# Patient Record
(demographics unavailable — no encounter records)

---

## 2024-12-26 NOTE — REVIEW OF SYSTEMS
[Negative] : Heme/Lymph [de-identified] : as per HPI  [de-identified] : as per HPI  [de-identified] : as per HPI

## 2024-12-26 NOTE — REASON FOR VISIT
[Subsequent Evaluation] : a subsequent evaluation for [Parents] : parents [FreeTextEntry2] : eustachian tube dysfunction,.

## 2024-12-26 NOTE — ASSESSMENT
[FreeTextEntry1] : GE is a 4 year old boy presenting for eustachian tube dysfunction,   1 ear infection, no complications reassurance natural course  current URI, saline, honey, conservative measures  follow up as needed

## 2024-12-26 NOTE — HISTORY OF PRESENT ILLNESS
[No Personal or Family History of Easy Bruising, Bleeding, or Issues with General Anesthesia] : No Personal or Family History of easy bruising, bleeding, or issues with general anesthesia [de-identified] : Today I had the pleasure of seeing GE DOMINGUEZ for follow up of eustachian tube dysfunction,. History was obtained from patient, chart and parents History of speech delay and otitis media  Continues speech services 2x a week with great improvements  Recent hospitalization last month for left otalgia was found to have a left ear infection-treated with amoxicillin.  No otorrhea  Reports cough for about one day- mom gave him otc cough medication and nasal saline spray  Mom notices hard nasal crusting and nasal congestion.  Denies coughing up blood or mucus  Eating and drinking well with no s/s of aspiration  No snoring.  Denies epistaxis, anterior rhinorrhea and fevers.

## 2025-06-04 NOTE — ASSESSMENT
[FreeTextEntry1] : Cough - no underlying allergies   Will try Flonase 2 puffs each nostril QD x 1 month for possible PND

## 2025-06-04 NOTE — SOCIAL HISTORY
[House] : [unfilled] lives in a house  [None] : none [Single] : single [FreeTextEntry1] : Lives with grandparents - mother  [Bedroom] : not in the bedroom [Basement] : not in the basement [Living Area] : not in the living area [Smokers in Household] : there are no smokers in the home

## 2025-06-04 NOTE — HISTORY OF PRESENT ILLNESS
[de-identified] : Patient had been seen by full time AI over 2 years ago - diagnosis of AR with PND - treated with Zyrtec and Flonase - Immuno CAP testing was performed and the results were negative.    He has been followed by ENT for ETD.    He has had recurrent coughing - ENT advised PND causing his coughing.    He was treated with nebulizer budesonide and albuterol - with some improvement.    No change with activity.   No complaints of abdominal pain or vomiting after meals

## 2025-06-04 NOTE — PHYSICAL EXAM
[Alert] : alert [Well Nourished] : well nourished [No Acute Distress] : no acute distress [Well Developed] : well developed [Normal Nasal Mucosa] : the nasal mucosa was normal [Normal Lips/Tongue] : the lips and tongue were normal [Normal Tonsils] : normal tonsils [No Neck Mass] : no neck mass was observed [No LAD] : no lymphadenopathy [Normal Rate and Effort] : normal respiratory rhythm and effort [No Crackles] : no crackles [No Retractions] : no retractions [Normal Rate] : heart rate was normal  [Normal S1, S2] : normal S1 and S2 [Regular Rhythm] : with a regular rhythm [Normal Cervical Lymph Nodes] : cervical [Skin Intact] : skin intact  [Normal Mood] : mood was normal [Normal Affect] : affect was normal [Wheezing] : no wheezing was heard